# Patient Record
Sex: MALE | Race: WHITE | NOT HISPANIC OR LATINO | Employment: OTHER | URBAN - METROPOLITAN AREA
[De-identification: names, ages, dates, MRNs, and addresses within clinical notes are randomized per-mention and may not be internally consistent; named-entity substitution may affect disease eponyms.]

---

## 2023-05-02 ENCOUNTER — OFFICE VISIT (OUTPATIENT)
Dept: PRIMARY CARE | Facility: CLINIC | Age: 83
End: 2023-05-02
Payer: MEDICARE

## 2023-05-02 VITALS
WEIGHT: 180 LBS | HEART RATE: 66 BPM | DIASTOLIC BLOOD PRESSURE: 70 MMHG | SYSTOLIC BLOOD PRESSURE: 119 MMHG | RESPIRATION RATE: 18 BRPM | OXYGEN SATURATION: 95 % | BODY MASS INDEX: 35.15 KG/M2 | TEMPERATURE: 97.9 F

## 2023-05-02 DIAGNOSIS — E11.9 DIABETES MELLITUS WITHOUT COMPLICATION (MULTI): ICD-10-CM

## 2023-05-02 DIAGNOSIS — Z87.898 HISTORY OF URINARY RETENTION: ICD-10-CM

## 2023-05-02 DIAGNOSIS — M47.819 DEGENERATIVE SPINAL ARTHRITIS: ICD-10-CM

## 2023-05-02 DIAGNOSIS — F01.53 VASCULAR DEMENTIA WITH MOOD DISTURBANCE, UNSPECIFIED DEMENTIA SEVERITY (MULTI): Primary | ICD-10-CM

## 2023-05-02 LAB
ESTIMATED AVERAGE GLUCOSE FOR HBA1C: 134 MG/DL
HEMOGLOBIN A1C/HEMOGLOBIN TOTAL IN BLOOD: 6.3 %
POC FINGERSTICK BLOOD GLUCOSE: 144 MG/DL (ref 70–100)

## 2023-05-02 PROCEDURE — 82962 GLUCOSE BLOOD TEST: CPT | Performed by: FAMILY MEDICINE

## 2023-05-02 PROCEDURE — 36415 COLL VENOUS BLD VENIPUNCTURE: CPT | Performed by: FAMILY MEDICINE

## 2023-05-02 PROCEDURE — 83036 HEMOGLOBIN GLYCOSYLATED A1C: CPT

## 2023-05-02 PROCEDURE — 1036F TOBACCO NON-USER: CPT | Performed by: FAMILY MEDICINE

## 2023-05-02 PROCEDURE — 99214 OFFICE O/P EST MOD 30 MIN: CPT | Performed by: FAMILY MEDICINE

## 2023-05-02 PROCEDURE — 1160F RVW MEDS BY RX/DR IN RCRD: CPT | Performed by: FAMILY MEDICINE

## 2023-05-02 PROCEDURE — 1159F MED LIST DOCD IN RCRD: CPT | Performed by: FAMILY MEDICINE

## 2023-05-02 PROCEDURE — 1157F ADVNC CARE PLAN IN RCRD: CPT | Performed by: FAMILY MEDICINE

## 2023-05-02 RX ORDER — METFORMIN HYDROCHLORIDE 500 MG/1
1 TABLET ORAL 3 TIMES DAILY
COMMUNITY
Start: 2019-09-13 | End: 2023-06-19

## 2023-05-02 RX ORDER — SULFAMETHOXAZOLE AND TRIMETHOPRIM 800; 160 MG/1; MG/1
1 TABLET ORAL DAILY
COMMUNITY
Start: 2017-03-30

## 2023-05-02 RX ORDER — CLOTRIMAZOLE AND BETAMETHASONE DIPROPIONATE 10; .64 MG/G; MG/G
CREAM TOPICAL 2 TIMES DAILY
COMMUNITY
Start: 2020-04-13

## 2023-05-02 RX ORDER — GLIPIZIDE 10 MG/1
1 TABLET, FILM COATED, EXTENDED RELEASE ORAL DAILY
COMMUNITY
Start: 2019-09-13

## 2023-05-02 RX ORDER — TAMSULOSIN HYDROCHLORIDE 0.4 MG/1
CAPSULE ORAL
COMMUNITY
Start: 2023-03-07

## 2023-05-02 RX ORDER — DONEPEZIL HYDROCHLORIDE 5 MG/1
5 TABLET, FILM COATED ORAL NIGHTLY
COMMUNITY
End: 2023-07-04

## 2023-05-02 RX ORDER — HYDROCODONE BITARTRATE AND ACETAMINOPHEN 5; 300 MG/1; MG/1
TABLET ORAL
COMMUNITY
Start: 2022-05-03

## 2023-05-02 RX ORDER — TRAMADOL HYDROCHLORIDE 50 MG/1
TABLET ORAL EVERY 8 HOURS
COMMUNITY
Start: 2022-08-17

## 2023-05-02 RX ORDER — LANCETS 33 GAUGE
EACH MISCELLANEOUS
COMMUNITY
Start: 2022-08-17

## 2023-05-02 ASSESSMENT — PAIN SCALES - GENERAL: PAINLEVEL: 0-NO PAIN

## 2023-05-02 NOTE — PROGRESS NOTES
Subjective   Destin Almanza is a 83 y.o. male who presents for Follow-up.  Diabetes and dementia.    HPI  ; patient is an 83-year-old diabetic male who is in today for recheck on his sugar and A1c.  He is with his wife since he does suffer from mild dementia.  He does respond when spoken to but otherwise he is somewhat quiet.  He has been on Aricept for close to a year and he has stabilized.  He does function well at home and he can still drive close to home as long as his wife is with him.  Patient ambulates with a cane and has spinal arthritis.  Due to his spinal arthritis and spinal stenosis he has to do self-catheterization.  He does follow closely with urology.  He will have his blood sugar and A1c rechecked today.      Objective  : RO10 systems were reviewed and the information is included in the HPI and no additional review of systems is indicated.S ;    Physical Exam  Vitals and nursing note reviewed.   Constitutional:       Appearance: Normal appearance.      Comments: Patient is alert but not 100% oriented.  He does respond when spoken to but does have some mild dementia.  He tends to get slightly forgetful.   HENT:      Head: Normocephalic.      Right Ear: Tympanic membrane and external ear normal.      Left Ear: Tympanic membrane and external ear normal.      Ears:      Comments: Patient is significantly hard of hearing.     Nose: Nose normal.      Mouth/Throat:      Mouth: Mucous membranes are moist.      Pharynx: Oropharynx is clear.      Comments: Mouth is somewhat dry ,  tongue is midline, no posterior pharyngeal erythema noted.  Eyes:      Extraocular Movements: Extraocular movements intact.      Conjunctiva/sclera: Conjunctivae normal.      Pupils: Pupils are equal, round, and reactive to light.   Neck:      Comments: Patient has chronic cervical degenerative disc disease with occasional neck spasm and restriction of motion secondary to arthritis, stress and tension.  Cardiovascular:      Rate  and Rhythm: Normal rate and regular rhythm.      Pulses: Normal pulses.      Heart sounds: Normal heart sounds.      Comments: Heart rhythm is stable S1 and S2 are noted, no ectopics.  Pulmonary:      Effort: Pulmonary effort is normal.      Comments: Lungs are clear to auscultation.  Denies any coughing or wheezing, does not complain of any shortness of breath.  Abdominal:      General: Bowel sounds are normal.      Palpations: Abdomen is soft.      Comments: Abdomen is soft and mildly obese, nontender, and denies any rebound or guarding.  No flank tenderness.  He denies diarrhea or constipation, no vomiting or abdominal pain.   Genitourinary:     Comments: Not examined  .  Follows with urology due to urinary retention and he must do self-catheterization.  Musculoskeletal:         General: Normal range of motion.      Cervical back: Normal range of motion.      Comments: Patient has advanced lumbosacral arthritis with spinal stenosis and he has had this problem for years.  It did cause him to have urinary retention and he now must do self-catheterization.  He ambulates with a cane and is very slow due to arthritis in his hips and knees.   Skin:     General: Skin is warm and dry.   Neurological:      General: No focal deficit present.      Mental Status: He is alert and oriented to person, place, and time. Mental status is at baseline.      Comments: Peripheral neuropathy and lumbosacral neuritis affecting function of the bladder, also his lower extremities and it does make him somewhat ataxic and he must use a cane.  Diminished reflexes upper and lower extremities.  Patient does suffer from mild dementia and is taking Aricept.  He is very stable and is functional at home.   Psychiatric:         Behavior: Behavior normal.      Comments: Patient has a flat mood and affect due to his mild dementia.  Does respond when spoken to.  Denies any depression but he does rely on his wife for most answers to questions.     PLAN   ; patient is an 83-year-old diabetic male who was evaluated today for diabetes, dementia, and discussion about his arthritis.  Patient's blood sugar was 144 and he does have a complete listing of his daily blood sugars.  He is quite well controlled and he does watch his diet closely.  Patient also has arthritis in his spine which has caused urinary retention and he does follow with urology and has to do self-catheterization for urine he also has seen pain management numerous times and had many nerve blocks.  Patient will be notified of his A1c result in 2 days and further recommendations will be made at that time.  He otherwise needs to ambulate with his cane so as not to fall and he does stay active around the house which helps his mental stability.  He will follow-up in 4 months or sooner as needed.    Problem List Items Addressed This Visit    None  Visit Diagnoses       Diabetes mellitus without complication (CMS/[facility])        Relevant Orders    POCT fingerstick glucose manually resulted (Completed)    Hemoglobin A1C                 Emory Shearer, DO

## 2023-06-17 DIAGNOSIS — E13.9 DIABETES 1.5, MANAGED AS TYPE 2 (MULTI): Primary | ICD-10-CM

## 2023-06-19 RX ORDER — METFORMIN HYDROCHLORIDE 500 MG/1
TABLET ORAL
Qty: 300 TABLET | Refills: 2 | Status: SHIPPED | OUTPATIENT
Start: 2023-06-19 | End: 2023-11-02 | Stop reason: CLARIF

## 2023-07-01 DIAGNOSIS — F01.A4 MILD VASCULAR DEMENTIA WITH ANXIETY (MULTI): Primary | ICD-10-CM

## 2023-07-04 RX ORDER — DONEPEZIL HYDROCHLORIDE 5 MG/1
TABLET, FILM COATED ORAL
Qty: 90 TABLET | Refills: 2 | Status: SHIPPED | OUTPATIENT
Start: 2023-07-04 | End: 2023-11-02 | Stop reason: CLARIF

## 2023-07-05 ENCOUNTER — TELEPHONE (OUTPATIENT)
Dept: PRIMARY CARE | Facility: CLINIC | Age: 83
End: 2023-07-05
Payer: MEDICARE

## 2023-07-05 NOTE — TELEPHONE ENCOUNTER
Patients wife calling for him stating he just got out of the hospital and is extremely depressed. She wants to know if there is something he can take to help with this?

## 2023-07-06 DIAGNOSIS — F32.89 OTHER DEPRESSION: Primary | ICD-10-CM

## 2023-07-06 RX ORDER — FLUOXETINE 10 MG/1
10 CAPSULE ORAL DAILY
Qty: 30 CAPSULE | Refills: 1 | Status: SHIPPED | OUTPATIENT
Start: 2023-07-06 | End: 2023-07-31 | Stop reason: SDUPTHER

## 2023-07-06 NOTE — TELEPHONE ENCOUNTER
I will send in 10 mg Prozac and can follow up in 1  or 2 weeks  Thanks.    Sad but he is 83 and has dementia.

## 2023-07-07 ENCOUNTER — TELEPHONE (OUTPATIENT)
Dept: PRIMARY CARE | Facility: CLINIC | Age: 83
End: 2023-07-07
Payer: MEDICARE

## 2023-07-07 NOTE — TELEPHONE ENCOUNTER
Razia called to say her dad is in need of home health care.  Can you please call her so she can discuss what the best options will be.    [phone] is the best number to call and if you could try and call around 3:00 p.m or lunch time, 11:30 - 1:30.

## 2023-07-11 NOTE — TELEPHONE ENCOUNTER
Razia called  she wants to know if home health has a . And wants to know about [employer]  [phone]

## 2023-07-12 PROCEDURE — G0180 MD CERTIFICATION HHA PATIENT: HCPCS | Performed by: FAMILY MEDICINE

## 2023-07-18 ENCOUNTER — OFFICE VISIT (OUTPATIENT)
Dept: PRIMARY CARE | Facility: CLINIC | Age: 83
End: 2023-07-18
Payer: MEDICARE

## 2023-07-18 VITALS
SYSTOLIC BLOOD PRESSURE: 168 MMHG | HEIGHT: 68 IN | DIASTOLIC BLOOD PRESSURE: 88 MMHG | WEIGHT: 173 LBS | BODY MASS INDEX: 26.22 KG/M2 | OXYGEN SATURATION: 98 % | RESPIRATION RATE: 16 BRPM | TEMPERATURE: 97.9 F | HEART RATE: 64 BPM

## 2023-07-18 DIAGNOSIS — M47.819 DEGENERATIVE SPINAL ARTHRITIS: ICD-10-CM

## 2023-07-18 DIAGNOSIS — E78.5 DYSLIPIDEMIA: ICD-10-CM

## 2023-07-18 DIAGNOSIS — N40.0 PROSTATIC HYPERTROPHY: ICD-10-CM

## 2023-07-18 DIAGNOSIS — N28.9 RENAL INSUFFICIENCY: Primary | ICD-10-CM

## 2023-07-18 DIAGNOSIS — I10 PRIMARY HYPERTENSION: ICD-10-CM

## 2023-07-18 DIAGNOSIS — Z12.5 SCREENING PSA (PROSTATE SPECIFIC ANTIGEN): ICD-10-CM

## 2023-07-18 DIAGNOSIS — E11.9 DIABETES MELLITUS WITHOUT COMPLICATION (MULTI): ICD-10-CM

## 2023-07-18 DIAGNOSIS — F01.54 VASCULAR DEMENTIA WITH ANXIETY, UNSPECIFIED DEMENTIA SEVERITY (MULTI): ICD-10-CM

## 2023-07-18 LAB
ALANINE AMINOTRANSFERASE (SGPT) (U/L) IN SER/PLAS: 16 U/L (ref 10–52)
ALBUMIN (G/DL) IN SER/PLAS: 4.4 G/DL (ref 3.4–5)
ALKALINE PHOSPHATASE (U/L) IN SER/PLAS: 41 U/L (ref 33–136)
ANION GAP IN SER/PLAS: 15 MMOL/L (ref 10–20)
ASPARTATE AMINOTRANSFERASE (SGOT) (U/L) IN SER/PLAS: 15 U/L (ref 9–39)
BILIRUBIN TOTAL (MG/DL) IN SER/PLAS: 0.4 MG/DL (ref 0–1.2)
CALCIUM (MG/DL) IN SER/PLAS: 9.3 MG/DL (ref 8.6–10.6)
CARBON DIOXIDE, TOTAL (MMOL/L) IN SER/PLAS: 22 MMOL/L (ref 21–32)
CHLORIDE (MMOL/L) IN SER/PLAS: 110 MMOL/L (ref 98–107)
CHOLESTEROL (MG/DL) IN SER/PLAS: 158 MG/DL (ref 0–199)
CHOLESTEROL IN HDL (MG/DL) IN SER/PLAS: 45.3 MG/DL
CHOLESTEROL/HDL RATIO: 3.5
CREATININE (MG/DL) IN SER/PLAS: 1.16 MG/DL (ref 0.5–1.3)
ERYTHROCYTE DISTRIBUTION WIDTH (RATIO) BY AUTOMATED COUNT: 13.2 % (ref 11.5–14.5)
ERYTHROCYTE MEAN CORPUSCULAR HEMOGLOBIN CONCENTRATION (G/DL) BY AUTOMATED: 32.1 G/DL (ref 32–36)
ERYTHROCYTE MEAN CORPUSCULAR VOLUME (FL) BY AUTOMATED COUNT: 99 FL (ref 80–100)
ERYTHROCYTES (10*6/UL) IN BLOOD BY AUTOMATED COUNT: 3.69 X10E12/L (ref 4.5–5.9)
GFR MALE: 62 ML/MIN/1.73M2
GLUCOSE (MG/DL) IN SER/PLAS: 142 MG/DL (ref 74–99)
HBA1C MFR BLD: 5.8 % (ref 4.2–6.5)
HEMATOCRIT (%) IN BLOOD BY AUTOMATED COUNT: 36.5 % (ref 41–52)
HEMOGLOBIN (G/DL) IN BLOOD: 11.7 G/DL (ref 13.5–17.5)
LDL: 90 MG/DL (ref 0–99)
LEUKOCYTES (10*3/UL) IN BLOOD BY AUTOMATED COUNT: 6.9 X10E9/L (ref 4.4–11.3)
NRBC (PER 100 WBCS) BY AUTOMATED COUNT: 0 /100 WBC (ref 0–0)
PLATELETS (10*3/UL) IN BLOOD AUTOMATED COUNT: 200 X10E9/L (ref 150–450)
POC FINGERSTICK BLOOD GLUCOSE: 161 MG/DL (ref 70–100)
POTASSIUM (MMOL/L) IN SER/PLAS: 4.5 MMOL/L (ref 3.5–5.3)
PROSTATE SPECIFIC ANTIGEN,SCREEN: 0.48 NG/ML (ref 0–4)
PROTEIN TOTAL: 7.2 G/DL (ref 6.4–8.2)
SODIUM (MMOL/L) IN SER/PLAS: 142 MMOL/L (ref 136–145)
THYROTROPIN (MIU/L) IN SER/PLAS BY DETECTION LIMIT <= 0.05 MIU/L: 1.95 MIU/L (ref 0.44–3.98)
TRIGLYCERIDE (MG/DL) IN SER/PLAS: 112 MG/DL (ref 0–149)
UREA NITROGEN (MG/DL) IN SER/PLAS: 32 MG/DL (ref 6–23)
VLDL: 22 MG/DL (ref 0–40)

## 2023-07-18 PROCEDURE — 80053 COMPREHEN METABOLIC PANEL: CPT

## 2023-07-18 PROCEDURE — 85027 COMPLETE CBC AUTOMATED: CPT

## 2023-07-18 PROCEDURE — 99214 OFFICE O/P EST MOD 30 MIN: CPT | Performed by: FAMILY MEDICINE

## 2023-07-18 PROCEDURE — 1159F MED LIST DOCD IN RCRD: CPT | Performed by: FAMILY MEDICINE

## 2023-07-18 PROCEDURE — 1036F TOBACCO NON-USER: CPT | Performed by: FAMILY MEDICINE

## 2023-07-18 PROCEDURE — 1160F RVW MEDS BY RX/DR IN RCRD: CPT | Performed by: FAMILY MEDICINE

## 2023-07-18 PROCEDURE — G0103 PSA SCREENING: HCPCS

## 2023-07-18 PROCEDURE — 3077F SYST BP >= 140 MM HG: CPT | Performed by: FAMILY MEDICINE

## 2023-07-18 PROCEDURE — 3079F DIAST BP 80-89 MM HG: CPT | Performed by: FAMILY MEDICINE

## 2023-07-18 PROCEDURE — 83036 HEMOGLOBIN GLYCOSYLATED A1C: CPT | Performed by: FAMILY MEDICINE

## 2023-07-18 PROCEDURE — 1126F AMNT PAIN NOTED NONE PRSNT: CPT | Performed by: FAMILY MEDICINE

## 2023-07-18 PROCEDURE — 84443 ASSAY THYROID STIM HORMONE: CPT

## 2023-07-18 PROCEDURE — 1157F ADVNC CARE PLAN IN RCRD: CPT | Performed by: FAMILY MEDICINE

## 2023-07-18 PROCEDURE — 80061 LIPID PANEL: CPT

## 2023-07-18 PROCEDURE — 82962 GLUCOSE BLOOD TEST: CPT | Performed by: FAMILY MEDICINE

## 2023-07-18 ASSESSMENT — PAIN SCALES - GENERAL: PAINLEVEL: 0-NO PAIN

## 2023-07-18 NOTE — PROGRESS NOTES
Subjective   Destin Almanza is a 83 y.o. male who presents for Follow-up (Patient complains of growth on left ear).    HPI  : Patient is a 83-year-old diabetic male who is being evaluated today in hospital follow-up from about 2 weeks ago when he went to the emergency department and was somewhat confused and had urinary retention and also a UTI infection.  Patient has had to do self-catheterization for his urine at least for 15 years and has not had any problems until recently.  It seems that he developed a UTI infection and could not empty his bladder with the catheter and was admitted to the hospital overnight for IV antibiotics and further observation and treatment.  He now is back to home and is able to do self-catheterization at home and he is scheduled for physical therapy.  Patient does have some mild vascular dementia but it does seem to have gotten a little bit worse over the past few months.  He still is able to live at home and his wife does assist and help take care of him.  He just needs to watch and make sure that his bladder empties completely so he does not develop a urinary infection.  He will also have blood work drawn today and we will check his sugar and A1c.  The son is also with the wife and the patient today in the exam room so we can review his home care and future treatment program at home.  He has an appointment with urology in 2 days for further evaluation of the self-catheterization.      Objective  :  ROS  :10 systems were reviewed and the information is included in the HPI and no additional review of systems is indicated.    Physical Exam  Vitals and nursing note reviewed.   Constitutional:       Appearance: Normal appearance. He is normal weight.      Comments: Patient is alert and oriented x3 but does have some moderate dementia.  Patient is happy and talkative.   HENT:      Head: Normocephalic and atraumatic.      Right Ear: Tympanic membrane and ear canal normal.      Left Ear:  Tympanic membrane and ear canal normal.      Ears:      Comments: Decreased hearing.  No hearing aids.  Patient also has a wart like growth on his left external top of his ear that will be anesthetized and excised.     Nose: Nose normal.      Mouth/Throat:      Mouth: Mucous membranes are moist.      Pharynx: Oropharynx is clear.      Comments: Mouth is moist, tongue is midline, no  posterior pharyngeal erythema  Eyes:      Extraocular Movements: Extraocular movements intact.      Conjunctiva/sclera: Conjunctivae normal.      Pupils: Pupils are equal, round, and reactive to light.      Comments: Patient denies any visual disturbance.  Does have a yearly eye exam.     Neck:      Comments: Patient does have some mild restriction of motion cervical spine due to arthritis.  Denies any cervical adenopathy and no thyromegaly.  No carotid bruits noted.  Cardiovascular:      Rate and Rhythm: Normal rate and regular rhythm.      Pulses: Normal pulses.      Heart sounds: Normal heart sounds. No murmur heard.     Comments: Patient denies any chest pain and no palpitations.     Heart rhythm is stable S1 and S2 are noted.  No murmurs or ectopics.   Pulmonary:      Effort: Pulmonary effort is normal.      Breath sounds: Normal breath sounds.      Comments: Lungs are clear to auscultation.       No coughing or wheezing noted.  Abdominal:      General: Bowel sounds are normal.      Palpations: Abdomen is soft.      Comments: Patient denies any abdominal pain, no rebound tenderness and no guarding.  Denies any flank tenderness.    Abdomen is soft and mildly obese, with positive bowel sounds x4.   Genitourinary:     Comments: Patient currently is doing self-catheterization like he has done for at least 15 to 20 years and since the infection is cleared up things have stabilized.  He did have some confusion and possible urosepsis when he had trouble with the catheterization 2 weeks ago.  Patient sees urology in 2 days and they will  also recheck his urine.  Musculoskeletal:         General: No swelling or tenderness. Normal range of motion.      Cervical back: Normal range of motion and neck supple.      Comments: Patient has spinal arthritis and as a result of severe lumbosacral disc disease has to do self-catheterization for his urine.  This is going on for probably 15 to 20 years and there have not been any problems until approximately 2 weeks ago when he went into the hospital with a urinary infection and urinary retention.  He also has arthritis in his hips and knees and has an unsteady gait and is scheduled for physical therapy at home.  He also has a visiting nurse once a week and they are monitoring his diabetes.   Skin:     General: Skin is warm and dry.      Findings: Lesion (Left ear skin wart.) present.      Comments: Patient has a skin wart on his left external upper ear canal and this was anesthetized with 1% local lidocaine and excised.  There was really no bleeding and I was able to use some dry cautery.   Neurological:      General: No focal deficit present.      Mental Status: He is alert and oriented to person, place, and time. Mental status is at baseline.      Comments: Patient has an unsteady gait due to some peripheral neuropathy and also spinal arthritis.  He is scheduled for home physical therapy and ambulation training.  He is using a cane at this time and is stable.  He does have some upper and lower extremity muscle weakness due to age.  Otherwise he is very independent and he and his wife cooperate and help each other at home.   Psychiatric:         Mood and Affect: Mood normal.         Behavior: Behavior normal.         Thought Content: Thought content normal.         Judgment: Judgment normal.      Comments: Patient has a flat affect but today is happy and does respond to conversation and questioning.  He does have vascular dementia and his thought content is not as good as it used to be.  He relies on his wife  and son for decision making and he does need some assistance with ambulation.  He will be receiving home physical therapy which should help.     PLAN : Patient is a 83-year-old elderly male who has mild to moderate vascular dementia.  He was in the hospital July 1 for urinary retention and urinary tract infection.  He has had to do self-catheterization for 15 to 20 years and has not had any problems.  Since the infection has resolved he is able to do his self-catheterization and he does see urology in 2 days for checkup.  Today his sugar was 161 and his A1c was stable at 5.8%.  Patient will be notified of his electrolytes and renal function in 3 days.  His son and wife were also present and he will be receiving home physical therapy next week.  This should help him ambulate better and be more self-sufficient.  He otherwise is stable we will follow-up in 3 to 4 months or sooner as needed.    Problem List Items Addressed This Visit       Diabetes mellitus without complication (CMS/[facility])    Relevant Orders    CBC    Comprehensive Metabolic Panel    Lipid Panel    POCT fingerstick glucose manually resulted (Completed)    Thyroid Stimulating Hormone    POCT Glycosylated Hemoglobin (HGB A1C) docked device    Degenerative spinal arthritis    Relevant Orders    CBC    Comprehensive Metabolic Panel    Lipid Panel    POCT fingerstick glucose manually resulted (Completed)    Thyroid Stimulating Hormone    POCT Glycosylated Hemoglobin (HGB A1C) docked device    Renal insufficiency - Primary    Screening PSA (prostate specific antigen)    Relevant Orders    Prostate Specific Antigen, Screen    Prostatic hypertrophy    Dyslipidemia    Relevant Orders    CBC    Comprehensive Metabolic Panel    Lipid Panel    POCT fingerstick glucose manually resulted (Completed)    Thyroid Stimulating Hormone    POCT Glycosylated Hemoglobin (HGB A1C) docked device    Primary hypertension    Vascular dementia with anxiety (CMS/[facility])            Emory  STU Shearer DO

## 2023-07-31 DIAGNOSIS — F32.89 OTHER DEPRESSION: ICD-10-CM

## 2023-07-31 RX ORDER — FLUOXETINE 10 MG/1
CAPSULE ORAL
Qty: 30 CAPSULE | Refills: 1 | Status: SHIPPED | OUTPATIENT
Start: 2023-07-31 | End: 2023-08-03

## 2023-08-02 DIAGNOSIS — F51.01 PRIMARY INSOMNIA: ICD-10-CM

## 2023-08-02 RX ORDER — MIRTAZAPINE 15 MG/1
15 TABLET, FILM COATED ORAL NIGHTLY
Qty: 30 TABLET | Refills: 2 | Status: SHIPPED | OUTPATIENT
Start: 2023-08-02 | End: 2023-08-16

## 2023-08-03 DIAGNOSIS — F32.89 OTHER DEPRESSION: ICD-10-CM

## 2023-08-03 RX ORDER — FLUOXETINE 10 MG/1
CAPSULE ORAL
Qty: 30 CAPSULE | Refills: 1 | Status: SHIPPED | OUTPATIENT
Start: 2023-08-03 | End: 2023-11-02 | Stop reason: CLARIF

## 2023-08-12 LAB
ANION GAP IN SER/PLAS: 14 MMOL/L (ref 10–20)
CALCIUM (MG/DL) IN SER/PLAS: 8.8 MG/DL (ref 8.6–10.3)
CARBON DIOXIDE, TOTAL (MMOL/L) IN SER/PLAS: 26 MMOL/L (ref 21–32)
CHLORIDE (MMOL/L) IN SER/PLAS: 107 MMOL/L (ref 98–107)
CREATININE (MG/DL) IN SER/PLAS: 1.03 MG/DL (ref 0.5–1.3)
GFR MALE: 71 ML/MIN/1.73M2
GLUCOSE (MG/DL) IN SER/PLAS: 215 MG/DL (ref 74–99)
POTASSIUM (MMOL/L) IN SER/PLAS: 3.9 MMOL/L (ref 3.5–5.3)
SODIUM (MMOL/L) IN SER/PLAS: 143 MMOL/L (ref 136–145)
UREA NITROGEN (MG/DL) IN SER/PLAS: 32 MG/DL (ref 6–23)

## 2023-08-16 DIAGNOSIS — F51.01 PRIMARY INSOMNIA: ICD-10-CM

## 2023-08-16 LAB
ALANINE AMINOTRANSFERASE (SGPT) (U/L) IN SER/PLAS: 31 U/L (ref 10–52)
ALBUMIN (G/DL) IN SER/PLAS: 3.2 G/DL (ref 3.4–5)
ALKALINE PHOSPHATASE (U/L) IN SER/PLAS: 44 U/L (ref 33–136)
ANION GAP IN SER/PLAS: 11 MMOL/L (ref 10–20)
ASPARTATE AMINOTRANSFERASE (SGOT) (U/L) IN SER/PLAS: 23 U/L (ref 9–39)
BASOPHILS (10*3/UL) IN BLOOD BY AUTOMATED COUNT: 0.03 X10E9/L (ref 0–0.1)
BASOPHILS/100 LEUKOCYTES IN BLOOD BY AUTOMATED COUNT: 0.3 % (ref 0–2)
BILIRUBIN TOTAL (MG/DL) IN SER/PLAS: 0.4 MG/DL (ref 0–1.2)
CALCIUM (MG/DL) IN SER/PLAS: 8.6 MG/DL (ref 8.6–10.3)
CARBON DIOXIDE, TOTAL (MMOL/L) IN SER/PLAS: 25 MMOL/L (ref 21–32)
CHLORIDE (MMOL/L) IN SER/PLAS: 110 MMOL/L (ref 98–107)
CHOLESTEROL (MG/DL) IN SER/PLAS: 75 MG/DL (ref 0–199)
CHOLESTEROL IN HDL (MG/DL) IN SER/PLAS: 32.2 MG/DL
CHOLESTEROL/HDL RATIO: 2.3
CREATININE (MG/DL) IN SER/PLAS: 1.07 MG/DL (ref 0.5–1.3)
EOSINOPHILS (10*3/UL) IN BLOOD BY AUTOMATED COUNT: 0.34 X10E9/L (ref 0–0.4)
EOSINOPHILS/100 LEUKOCYTES IN BLOOD BY AUTOMATED COUNT: 3.9 % (ref 0–6)
ERYTHROCYTE DISTRIBUTION WIDTH (RATIO) BY AUTOMATED COUNT: 13 % (ref 11.5–14.5)
ERYTHROCYTE MEAN CORPUSCULAR HEMOGLOBIN CONCENTRATION (G/DL) BY AUTOMATED: 33 G/DL (ref 32–36)
ERYTHROCYTE MEAN CORPUSCULAR VOLUME (FL) BY AUTOMATED COUNT: 100 FL (ref 80–100)
ERYTHROCYTES (10*6/UL) IN BLOOD BY AUTOMATED COUNT: 3.2 X10E12/L (ref 4.5–5.9)
ESTIMATED AVERAGE GLUCOSE FOR HBA1C: 148 MG/DL
GFR MALE: 68 ML/MIN/1.73M2
GLUCOSE (MG/DL) IN SER/PLAS: 122 MG/DL (ref 74–99)
HEMATOCRIT (%) IN BLOOD BY AUTOMATED COUNT: 32.1 % (ref 41–52)
HEMOGLOBIN (G/DL) IN BLOOD: 10.6 G/DL (ref 13.5–17.5)
HEMOGLOBIN A1C/HEMOGLOBIN TOTAL IN BLOOD: 6.8 %
IMMATURE GRANULOCYTES/100 LEUKOCYTES IN BLOOD BY AUTOMATED COUNT: 0.5 % (ref 0–0.9)
LDL: 26 MG/DL (ref 0–99)
LEUKOCYTES (10*3/UL) IN BLOOD BY AUTOMATED COUNT: 8.6 X10E9/L (ref 4.4–11.3)
LYMPHOCYTES (10*3/UL) IN BLOOD BY AUTOMATED COUNT: 1.65 X10E9/L (ref 0.8–3)
LYMPHOCYTES/100 LEUKOCYTES IN BLOOD BY AUTOMATED COUNT: 19.1 % (ref 13–44)
MONOCYTES (10*3/UL) IN BLOOD BY AUTOMATED COUNT: 0.62 X10E9/L (ref 0.05–0.8)
MONOCYTES/100 LEUKOCYTES IN BLOOD BY AUTOMATED COUNT: 7.2 % (ref 2–10)
NEUTROPHILS (10*3/UL) IN BLOOD BY AUTOMATED COUNT: 5.94 X10E9/L (ref 1.6–5.5)
NEUTROPHILS/100 LEUKOCYTES IN BLOOD BY AUTOMATED COUNT: 69 % (ref 40–80)
NRBC (PER 100 WBCS) BY AUTOMATED COUNT: 0 /100 WBC (ref 0–0)
PLATELETS (10*3/UL) IN BLOOD AUTOMATED COUNT: 278 X10E9/L (ref 150–450)
POTASSIUM (MMOL/L) IN SER/PLAS: 4.5 MMOL/L (ref 3.5–5.3)
PROTEIN TOTAL: 5.7 G/DL (ref 6.4–8.2)
SODIUM (MMOL/L) IN SER/PLAS: 141 MMOL/L (ref 136–145)
TRIGLYCERIDE (MG/DL) IN SER/PLAS: 83 MG/DL (ref 0–149)
UREA NITROGEN (MG/DL) IN SER/PLAS: 35 MG/DL (ref 6–23)
VLDL: 17 MG/DL (ref 0–40)

## 2023-08-16 RX ORDER — MIRTAZAPINE 15 MG/1
15 TABLET, FILM COATED ORAL NIGHTLY
Qty: 90 TABLET | Refills: 0 | Status: SHIPPED | OUTPATIENT
Start: 2023-08-16 | End: 2023-11-02 | Stop reason: CLARIF

## 2023-08-21 ENCOUNTER — DOCUMENTATION (OUTPATIENT)
Dept: PRIMARY CARE | Facility: CLINIC | Age: 83
End: 2023-08-21
Payer: MEDICARE

## 2023-08-21 ENCOUNTER — PATIENT OUTREACH (OUTPATIENT)
Dept: CARDIOLOGY | Facility: CLINIC | Age: 83
End: 2023-08-21
Payer: MEDICARE

## 2023-09-06 ENCOUNTER — APPOINTMENT (OUTPATIENT)
Dept: PRIMARY CARE | Facility: CLINIC | Age: 83
End: 2023-09-06
Payer: MEDICARE

## 2023-11-01 RX ORDER — GLIPIZIDE 10 MG/1
10 TABLET, FILM COATED, EXTENDED RELEASE ORAL DAILY
Qty: 100 TABLET | Refills: 2 | Status: CANCELLED | OUTPATIENT
Start: 2023-11-01